# Patient Record
Sex: MALE | Race: OTHER | HISPANIC OR LATINO
[De-identification: names, ages, dates, MRNs, and addresses within clinical notes are randomized per-mention and may not be internally consistent; named-entity substitution may affect disease eponyms.]

---

## 2017-02-23 PROBLEM — Z00.00 ENCOUNTER FOR PREVENTIVE HEALTH EXAMINATION: Status: ACTIVE | Noted: 2017-02-23

## 2017-03-06 ENCOUNTER — APPOINTMENT (OUTPATIENT)
Dept: HUMAN REPRODUCTION | Facility: CLINIC | Age: 32
End: 2017-03-06

## 2018-03-19 ENCOUNTER — APPOINTMENT (OUTPATIENT)
Dept: SURGERY | Facility: CLINIC | Age: 33
End: 2018-03-19
Payer: COMMERCIAL

## 2018-03-19 ENCOUNTER — LABORATORY RESULT (OUTPATIENT)
Age: 33
End: 2018-03-19

## 2018-03-19 VITALS
HEIGHT: 72 IN | HEART RATE: 69 BPM | WEIGHT: 176 LBS | SYSTOLIC BLOOD PRESSURE: 106 MMHG | TEMPERATURE: 98.2 F | DIASTOLIC BLOOD PRESSURE: 71 MMHG | BODY MASS INDEX: 23.84 KG/M2

## 2018-03-19 DIAGNOSIS — R22.2 LOCALIZED SWELLING, MASS AND LUMP, TRUNK: ICD-10-CM

## 2018-03-19 DIAGNOSIS — Z72.3 LACK OF PHYSICAL EXERCISE: ICD-10-CM

## 2018-03-19 PROCEDURE — 99202 OFFICE O/P NEW SF 15 MIN: CPT | Mod: 25

## 2018-03-19 PROCEDURE — 21555 EXC NECK LES SC < 3 CM: CPT

## 2018-03-29 ENCOUNTER — APPOINTMENT (OUTPATIENT)
Dept: SURGERY | Facility: CLINIC | Age: 33
End: 2018-03-29

## 2020-04-09 PROBLEM — R22.2 MASS OF CHEST WALL: Status: ACTIVE | Noted: 2018-03-19

## 2021-09-27 ENCOUNTER — APPOINTMENT (OUTPATIENT)
Dept: UROLOGY | Facility: CLINIC | Age: 36
End: 2021-09-27
Payer: COMMERCIAL

## 2021-09-27 VITALS
BODY MASS INDEX: 23.7 KG/M2 | HEART RATE: 73 BPM | RESPIRATION RATE: 16 BRPM | HEIGHT: 72 IN | OXYGEN SATURATION: 95 % | WEIGHT: 175 LBS | TEMPERATURE: 98.3 F | DIASTOLIC BLOOD PRESSURE: 72 MMHG | SYSTOLIC BLOOD PRESSURE: 113 MMHG

## 2021-09-27 PROCEDURE — 99204 OFFICE O/P NEW MOD 45 MIN: CPT

## 2021-09-27 RX ORDER — CLOTRIMAZOLE AND BETAMETHASONE DIPROPIONATE 10; .5 MG/G; MG/G
1-0.05 CREAM TOPICAL TWICE DAILY
Qty: 1 | Refills: 1 | Status: ACTIVE | COMMUNITY
Start: 2021-09-27 | End: 1900-01-01

## 2021-09-27 NOTE — HISTORY OF PRESENT ILLNESS
[FreeTextEntry1] : Patient is a 35 yo M who presents for phimosis and balanitis.\par \par He reports past 2 months he has noticed tightening of foreskin and redness of foreskin/glans.  He has pain when his penis is erect, but also lubricating well he can engage in sexual activity without difficulty or pain.  At rest, to retract his foreskin there is some difficulty and mild discomfort.\par \par He also notes redness on head of penis.  No itch.  No ulcerations or sores.\par \par Denies DM.  No fever/chills or difficulty voiding.

## 2021-09-27 NOTE — PHYSICAL EXAM
[General Appearance - Well Developed] : well developed [General Appearance - Well Nourished] : well nourished [Normal Appearance] : normal appearance [Well Groomed] : well groomed [General Appearance - In No Acute Distress] : no acute distress [Edema] : no peripheral edema [Respiration, Rhythm And Depth] : normal respiratory rhythm and effort [Exaggerated Use Of Accessory Muscles For Inspiration] : no accessory muscle use [Abdomen Soft] : soft [Abdomen Tenderness] : non-tender [Abdomen Hernia] : no hernia was discovered [Urethral Meatus] : meatus normal [Urinary Bladder Findings] : the bladder was normal on palpation [Scrotum] : the scrotum was normal [Epididymis] : the epididymides were normal [Testes Tenderness] : no tenderness of the testes [Testes Mass (___cm)] : there were no testicular masses [FreeTextEntry1] : uncirc phallus with phimotic mild band - able to retract foreskin.  There is mild redness/erythema of glans c/w balanitis [Normal Station and Gait] : the gait and station were normal for the patient's age [] : no rash [No Focal Deficits] : no focal deficits [Oriented To Time, Place, And Person] : oriented to person, place, and time [Affect] : the affect was normal [Mood] : the mood was normal [Not Anxious] : not anxious

## 2021-09-27 NOTE — ASSESSMENT
[FreeTextEntry1] : Patient is a 37 yo M who presents with phimosis and balanitis.\par \par -discussed with pt hygiene practices\par -topical antifungal/steroid cream rx provided, instructed on proper use\par -discussed with pt that he should see his PCP for evaluation of potential underlying contributors such as DM\par -D/w pt that if topical cream does not resolve his condition, he will likely require circumcision\par -f/u 2 mos

## 2021-11-08 ENCOUNTER — APPOINTMENT (OUTPATIENT)
Dept: UROLOGY | Facility: CLINIC | Age: 36
End: 2021-11-08
Payer: COMMERCIAL

## 2021-11-08 VITALS
SYSTOLIC BLOOD PRESSURE: 105 MMHG | RESPIRATION RATE: 16 BRPM | HEART RATE: 69 BPM | TEMPERATURE: 98 F | BODY MASS INDEX: 24.14 KG/M2 | OXYGEN SATURATION: 95 % | DIASTOLIC BLOOD PRESSURE: 70 MMHG | WEIGHT: 178 LBS

## 2021-11-08 PROCEDURE — 99214 OFFICE O/P EST MOD 30 MIN: CPT

## 2021-11-08 NOTE — PHYSICAL EXAM
[General Appearance - Well Developed] : well developed [General Appearance - Well Nourished] : well nourished [Normal Appearance] : normal appearance [Well Groomed] : well groomed [General Appearance - In No Acute Distress] : no acute distress [Urethral Meatus] : meatus normal [Urinary Bladder Findings] : the bladder was normal on palpation [Scrotum] : the scrotum was normal [Epididymis] : the epididymides were normal [Testes Tenderness] : no tenderness of the testes [Testes Mass (___cm)] : there were no testicular masses [FreeTextEntry1] : uncirc phallus with phimotic mild band - able to retract foreskin.  Minimal redness - significantly improved

## 2021-11-08 NOTE — ASSESSMENT
[FreeTextEntry1] : Patient is a 37 yo M who presents for phimosis.\par \par I discussed with pt the circumcision procedure and its risks/benefits/alternatives.  I d/w pt that there will be a scar circumferentially around his shaft after foreskin removed, and there are cosmetic considerations to the scar and differences in shaft and foreskin color.  I also d/w pt that he may experience sexual changes/numbness after circumcision.  Also d/w pt typical postop recovery and need for abstinence after circumcision.

## 2021-11-08 NOTE — HISTORY OF PRESENT ILLNESS
[FreeTextEntry1] : Patient is a 37 yo M who presents for phimosis and balanitis.\par \par HPI/PRIOR HX:\par He reports past 2 months he has noticed tightening of foreskin and redness of foreskin/glans.  He has pain when his penis is erect, but also lubricating well he can engage in sexual activity without difficulty or pain.  At rest, to retract his foreskin there is some difficulty and mild discomfort.\par He also notes redness on head of penis.  No itch.  No ulcerations or sores.\par Denies DM.  No fever/chills or difficulty voiding.\par \par INTERVAL hx:\par Applied topical cream - rash has resolved.\par The tight band has not resolved - he still has tightness and pain with erections.\par He desires circumcision. Price (Do Not Change): 0.00 Instructions: This plan will send the code FBSD to the PM system.  DO NOT or CHANGE the price. no Detail Level: Simple

## 2022-02-25 ENCOUNTER — OUTPATIENT (OUTPATIENT)
Dept: OUTPATIENT SERVICES | Facility: HOSPITAL | Age: 37
LOS: 1 days | End: 2022-02-25
Payer: COMMERCIAL

## 2022-02-25 VITALS
OXYGEN SATURATION: 98 % | DIASTOLIC BLOOD PRESSURE: 75 MMHG | TEMPERATURE: 99 F | HEART RATE: 72 BPM | HEIGHT: 71 IN | RESPIRATION RATE: 16 BRPM | WEIGHT: 169.09 LBS | SYSTOLIC BLOOD PRESSURE: 107 MMHG

## 2022-02-25 DIAGNOSIS — N47.1 PHIMOSIS: ICD-10-CM

## 2022-02-25 DIAGNOSIS — Z78.9 OTHER SPECIFIED HEALTH STATUS: ICD-10-CM

## 2022-02-25 DIAGNOSIS — Z98.890 OTHER SPECIFIED POSTPROCEDURAL STATES: Chronic | ICD-10-CM

## 2022-02-25 DIAGNOSIS — Z01.818 ENCOUNTER FOR OTHER PREPROCEDURAL EXAMINATION: ICD-10-CM

## 2022-02-25 LAB
ANION GAP SERPL CALC-SCNC: 14 MMOL/L — SIGNIFICANT CHANGE UP (ref 5–17)
BUN SERPL-MCNC: 19 MG/DL — SIGNIFICANT CHANGE UP (ref 7–23)
CALCIUM SERPL-MCNC: 9.3 MG/DL — SIGNIFICANT CHANGE UP (ref 8.4–10.5)
CHLORIDE SERPL-SCNC: 105 MMOL/L — SIGNIFICANT CHANGE UP (ref 96–108)
CO2 SERPL-SCNC: 22 MMOL/L — SIGNIFICANT CHANGE UP (ref 22–31)
CREAT SERPL-MCNC: 0.86 MG/DL — SIGNIFICANT CHANGE UP (ref 0.5–1.3)
GLUCOSE SERPL-MCNC: 94 MG/DL — SIGNIFICANT CHANGE UP (ref 70–99)
HCT VFR BLD CALC: 45 % — SIGNIFICANT CHANGE UP (ref 39–50)
HGB BLD-MCNC: 15.2 G/DL — SIGNIFICANT CHANGE UP (ref 13–17)
MCHC RBC-ENTMCNC: 29.8 PG — SIGNIFICANT CHANGE UP (ref 27–34)
MCHC RBC-ENTMCNC: 33.8 GM/DL — SIGNIFICANT CHANGE UP (ref 32–36)
MCV RBC AUTO: 88.2 FL — SIGNIFICANT CHANGE UP (ref 80–100)
NRBC # BLD: 0 /100 WBCS — SIGNIFICANT CHANGE UP (ref 0–0)
PLATELET # BLD AUTO: 296 K/UL — SIGNIFICANT CHANGE UP (ref 150–400)
POTASSIUM SERPL-MCNC: 4.1 MMOL/L — SIGNIFICANT CHANGE UP (ref 3.5–5.3)
POTASSIUM SERPL-SCNC: 4.1 MMOL/L — SIGNIFICANT CHANGE UP (ref 3.5–5.3)
RBC # BLD: 5.1 M/UL — SIGNIFICANT CHANGE UP (ref 4.2–5.8)
RBC # FLD: 12.1 % — SIGNIFICANT CHANGE UP (ref 10.3–14.5)
SODIUM SERPL-SCNC: 141 MMOL/L — SIGNIFICANT CHANGE UP (ref 135–145)
WBC # BLD: 7.69 K/UL — SIGNIFICANT CHANGE UP (ref 3.8–10.5)
WBC # FLD AUTO: 7.69 K/UL — SIGNIFICANT CHANGE UP (ref 3.8–10.5)

## 2022-02-25 PROCEDURE — 85027 COMPLETE CBC AUTOMATED: CPT

## 2022-02-25 PROCEDURE — 80048 BASIC METABOLIC PNL TOTAL CA: CPT

## 2022-02-25 PROCEDURE — G0463: CPT

## 2022-02-25 RX ORDER — LIDOCAINE HCL 20 MG/ML
0.2 VIAL (ML) INJECTION ONCE
Refills: 0 | Status: DISCONTINUED | OUTPATIENT
Start: 2022-03-02 | End: 2022-03-16

## 2022-02-25 RX ORDER — SODIUM CHLORIDE 9 MG/ML
3 INJECTION INTRAMUSCULAR; INTRAVENOUS; SUBCUTANEOUS EVERY 8 HOURS
Refills: 0 | Status: DISCONTINUED | OUTPATIENT
Start: 2022-03-02 | End: 2022-03-16

## 2022-02-25 RX ORDER — SODIUM CHLORIDE 9 MG/ML
1000 INJECTION, SOLUTION INTRAVENOUS
Refills: 0 | Status: DISCONTINUED | OUTPATIENT
Start: 2022-03-02 | End: 2022-03-16

## 2022-02-25 NOTE — H&P PST ADULT - HISTORY OF PRESENT ILLNESS
36 year old male with no significant PMH reports c/o tightening of foreskin around the penis with redness x 2 months. He denies any itchiness, ulcerations, sores or difficulty voiding. He has been using a topical cream which helped resolved the rash, however the tightness and discomfort is still present. Pt now presents to [facility] for scheduled circumcision with Dr. Shaikh on 3/2/22.    Covid PCR test scheduled for 2/27/22 at [facility]  Covid vaccine 2nd dose received  Denies Recent travel, Exposure or Covid symptoms  No recent hospitalizations over the past 3 months 36 year old male with PMH Lumbar Surgery (3 years ago) reports c/o tightening of foreskin around the penis with redness x 2 months. He denies any itchiness, ulcerations, sores or difficulty voiding. He has been using a topical cream which helped resolved the rash, however the tightness and discomfort is still present. Pt now presents to [facility] for scheduled circumcision with Dr. Shaikh on 3/2/22.    Covid vaccine Pfizer 2nd dose received 8/14/21; Pending Booster  Denies Recent travel, Exposure or Covid symptoms  No recent hospitalizations over the past 3 months  Covid + 1/11/22 (in HIE with [facility]) Symptoms of cough and loss of smell x few days; No SOB or Hospitalizations - Managed conservatively at home 36 year old male with PMH Religious and Lumbar Surgery (3 years ago) reports c/o tightening of foreskin around the penis with redness x 2 months. He denies any itchiness, ulcerations, sores or difficulty voiding. He has been using a topical cream which helped resolved the rash, however the tightness and discomfort is still present. Pt now presents to [facility] for scheduled circumcision with Dr. Shaikh on 3/2/22.    Covid vaccine Pfizer 2nd dose received 8/14/21; Pending Booster  Denies Recent travel, Exposure or Covid symptoms  No recent hospitalizations over the past 3 months  Covid + 1/11/22 (in HIE with [facility]) Symptoms of cough and loss of smell x few days; No SOB or Hospitalizations - Managed conservatively at home  No Pre-Op Covid Test Required as per [facility] Protocol (Covid + within 90 days)

## 2022-02-25 NOTE — H&P PST ADULT - PROBLEM SELECTOR PLAN 1
Pt is scheduled to have a circumc[facility] Dr[facility]  Covid PCR test scheduled for 2/27/22 at [facility]  CBC, BMP ordered and obtained at [facility] Pt is scheduled to have a circumcision with Dr. Shaikh on 3/2/[facility]  CBC, BMP ordered and obtained at [facility]  No Covid PCR Pre-Op Test Required as per [facility] Protocol (Covid + within 90 days)  Blood Refusal Consent form filled out by patient and in chart Pt is scheduled to have a circumcision with Dr. Shaikh on 3/2/[facility]  CBC, BMP ordered and obtained at [facility]  No Covid PCR Pre-Op Test Required as per [facility] Protocol (Covid + within 90 days)

## 2022-02-25 NOTE — H&P PST ADULT - FALL HARM RISK - UNIVERSAL INTERVENTIONS
Bed in lowest position, wheels locked, appropriate side rails in place/Call bell, personal items and telephone in reach/Instruct patient to call for assistance before getting out of bed or chair/Non-slip footwear when patient is out of bed/[place] to call system/Physically safe environment - no spills, clutter or unnecessary equipment/Purposeful Proactive Rounding/Room/bathroom lighting operational, light cord in reach

## 2022-02-25 NOTE — H&P PST ADULT - NSICDXPASTMEDICALHX_GEN_ALL_CORE_FT
PAST MEDICAL HISTORY:  Phimosis of penis      PAST MEDICAL HISTORY:  2019 novel coronavirus disease (COVID-19) Covid + 1/11/22 (in HIE with [facility]) Symptoms of cough and loss of smell x few days; No SOB or Hospitalizations - Managed conservatively at home    Phimosis of penis      PAST MEDICAL HISTORY:  2019 novel coronavirus disease (COVID-19) Covid + 1/11/22 (in HIE with [facility]) Symptoms of cough and loss of smell x few days; No SOB or Hospitalizations - Managed conservatively at home    Patient is Tenriism     Phimosis of penis

## 2022-02-25 NOTE — H&P PST ADULT - PROBLEM SELECTOR PLAN 2
Surgeon notified  HCP copy in chart  Pt provided a blood refusal consent form that he will be bringing in the DOS completed

## 2022-02-25 NOTE — H&P PST ADULT - ALCOHOL USE HISTORY SINGLE SELECT
Prophylactic measure Prophylactic measure Prophylactic measure Prophylactic measure Prophylactic measure Prophylactic measure Prophylactic measure Prophylactic measure Prophylactic measure Prophylactic measure Prophylactic measure never

## 2022-03-01 ENCOUNTER — TRANSCRIPTION ENCOUNTER (OUTPATIENT)
Age: 37
End: 2022-03-01

## 2022-03-02 ENCOUNTER — RESULT REVIEW (OUTPATIENT)
Age: 37
End: 2022-03-02

## 2022-03-02 ENCOUNTER — OUTPATIENT (OUTPATIENT)
Dept: OUTPATIENT SERVICES | Facility: HOSPITAL | Age: 37
LOS: 1 days | End: 2022-03-02
Payer: COMMERCIAL

## 2022-03-02 ENCOUNTER — APPOINTMENT (OUTPATIENT)
Dept: UROLOGY | Facility: HOSPITAL | Age: 37
End: 2022-03-02

## 2022-03-02 VITALS
DIASTOLIC BLOOD PRESSURE: 69 MMHG | SYSTOLIC BLOOD PRESSURE: 115 MMHG | HEART RATE: 84 BPM | OXYGEN SATURATION: 100 % | RESPIRATION RATE: 16 BRPM

## 2022-03-02 VITALS
SYSTOLIC BLOOD PRESSURE: 114 MMHG | OXYGEN SATURATION: 99 % | HEART RATE: 75 BPM | DIASTOLIC BLOOD PRESSURE: 75 MMHG | WEIGHT: 169.09 LBS | TEMPERATURE: 98 F | RESPIRATION RATE: 15 BRPM | HEIGHT: 71 IN

## 2022-03-02 DIAGNOSIS — Z98.890 OTHER SPECIFIED POSTPROCEDURAL STATES: Chronic | ICD-10-CM

## 2022-03-02 DIAGNOSIS — N47.1 PHIMOSIS: ICD-10-CM

## 2022-03-02 PROCEDURE — 88304 TISSUE EXAM BY PATHOLOGIST: CPT | Mod: 26

## 2022-03-02 PROCEDURE — 88304 TISSUE EXAM BY PATHOLOGIST: CPT

## 2022-03-02 PROCEDURE — 54161 CIRCUM 28 DAYS OR OLDER: CPT

## 2022-03-02 RX ORDER — ONDANSETRON 8 MG/1
4 TABLET, FILM COATED ORAL ONCE
Refills: 0 | Status: DISCONTINUED | OUTPATIENT
Start: 2022-03-02 | End: 2022-03-16

## 2022-03-02 RX ORDER — OXYCODONE HYDROCHLORIDE 5 MG/1
5 TABLET ORAL ONCE
Refills: 0 | Status: DISCONTINUED | OUTPATIENT
Start: 2022-03-02 | End: 2022-03-02

## 2022-03-02 RX ADMIN — SODIUM CHLORIDE 100 MILLILITER(S): 9 INJECTION, SOLUTION INTRAVENOUS at 05:52

## 2022-03-02 NOTE — ASU DISCHARGE PLAN (ADULT/PEDIATRIC) - NURSING INSTRUCTIONS
You may take tylenol and ibuprophen for pain as needed. The next time you can take tylenol or ibuprophen would be at 120pm

## 2022-03-02 NOTE — ASU PATIENT PROFILE, ADULT - FALL HARM RISK - UNIVERSAL INTERVENTIONS
Bed in lowest position, wheels locked, appropriate side rails in place/Call bell, personal items and telephone in reach/Instruct patient to call for assistance before getting out of bed or chair/Non-slip footwear when patient is out of bed/[place] to call system/Physically safe environment - no spills, clutter or unnecessary equipment/Purposeful Proactive Rounding/Room/bathroom lighting operational, light cord in reach

## 2022-03-02 NOTE — ASU DISCHARGE PLAN (ADULT/PEDIATRIC) - CALL YOUR DOCTOR IF YOU HAVE ANY OF THE FOLLOWING:
Fever greater than (need to indicate Fahrenheit or Celsius)/Numbness, tingling, color or temperature change to extremity/Nausea and vomiting that does not stop/Unable to urinate

## 2022-03-02 NOTE — ASU PATIENT PROFILE, ADULT - NSICDXPASTMEDICALHX_GEN_ALL_CORE_FT
PAST MEDICAL HISTORY:  2019 novel coronavirus disease (COVID-19) Covid + 1/11/22 (in HIE with [facility]) Symptoms of cough and loss of smell x few days; No SOB or Hospitalizations - Managed conservatively at home    Patient is [employer]ian[employer]     Phimosis of penis

## 2022-03-02 NOTE — ASU DISCHARGE PLAN (ADULT/PEDIATRIC) - CARE PROVIDER_API CALL
Pk Shaikh)  Urology  [address] [place] [address], [place] [address]  Phone: [phone]  Fax: [phone]  Follow Up Time:

## 2022-03-02 NOTE — ASU PATIENT PROFILE, ADULT - NSICDXPASTSURGICALHX_GEN_ALL_CORE_FT
PAST SURGICAL HISTORY:  History of endoscopy "years ago" for acid reflux - not on any medications currently    History of lumbar surgery possibly 3 years ago - unsure of type of lumbar surgery    S/P LASIK surgery of both eyes 9 years ago

## 2022-03-02 NOTE — ASU DISCHARGE PLAN (ADULT/PEDIATRIC) - NS MD DC FALL RISK RISK
For information on Fall & Injury Prevention, visit: [email or web address] OR  [email or web address] OR  https://www.cdc.gov/steadi/patient.html

## 2022-03-02 NOTE — ASU DISCHARGE PLAN (ADULT/PEDIATRIC) - ASU DC SPECIAL INSTRUCTIONSFT
BATHING: Please do not submerge wound underwater. Remove dressing in 48 hours. You may shower in 48 hours. Keep incisions clean, you may apply bacitracin to the incision after daily after dressing removal  ACTIVITY: No straddle activities such as bicycle or sexual intercourse until you are evaluated at your post-operative appointment. Otherwise, you may return to your usual level of physical activity.  DIET: Return to your usual diet, begin with liquid foods and progress slowly.  NOTIFY YOUR SURGEON IF: You have any bleeding that does not stop, any pus draining from your wound, any fever (over 100.4 F) or chills, persistent nausea/vomiting, persistent diarrhea, or if your pain is not controlled on your discharge pain medications.  PAIN: You may take Tylenol and Ibuprofen, available over the counter, for pain. You may take either one every 6 hours, you may alternate these medications so that you take one every 3 hours (e.g., Tylenol at 12pm, Ibuprofen at 3pm, Tylenol at 6pm, Ibuprofen at 9pm, etc...). Follow the maximum doses and administration instructions on the bottles. Do not exceed 4 grams of Tylenol daily. If your pain is not controlled with over the counter pain medications, please call your urologist.  FOLLOW-UP: When you arrive home or the following day, please call to arrange a follow-up appointment with Dr. Shaikh in 2 weeks.

## 2022-03-03 PROBLEM — N47.1 PHIMOSIS: Chronic | Status: ACTIVE | Noted: 2022-02-25

## 2022-03-03 PROBLEM — U07.1 COVID-19: Chronic | Status: ACTIVE | Noted: 2022-02-25

## 2022-03-03 PROBLEM — Z78.9 OTHER SPECIFIED HEALTH STATUS: Chronic | Status: ACTIVE | Noted: 2022-02-25

## 2022-03-08 ENCOUNTER — APPOINTMENT (OUTPATIENT)
Age: 37
End: 2022-03-08

## 2022-03-08 LAB — SURGICAL PATHOLOGY STUDY: SIGNIFICANT CHANGE UP

## 2022-03-14 ENCOUNTER — APPOINTMENT (OUTPATIENT)
Dept: UROLOGY | Facility: CLINIC | Age: 37
End: 2022-03-14
Payer: COMMERCIAL

## 2022-03-14 VITALS
DIASTOLIC BLOOD PRESSURE: 66 MMHG | WEIGHT: 175 LBS | HEART RATE: 78 BPM | OXYGEN SATURATION: 96 % | SYSTOLIC BLOOD PRESSURE: 105 MMHG | TEMPERATURE: 98 F | RESPIRATION RATE: 16 BRPM | BODY MASS INDEX: 23.73 KG/M2

## 2022-03-14 PROCEDURE — 99213 OFFICE O/P EST LOW 20 MIN: CPT

## 2022-03-14 NOTE — ASSESSMENT
[FreeTextEntry1] : Patient is a 37 yo M who presents for phimosis and balanitis.\par \par \par S/p circumcision\par Doing well\par F/u 1 mo

## 2022-03-14 NOTE — HISTORY OF PRESENT ILLNESS
[FreeTextEntry1] : Patient is a 35 yo M who presents for phimosis and balanitis.\par \par HPI/PRIOR HX:\par He reports past 2 months he has noticed tightening of foreskin and redness of foreskin/glans.  He has pain when his penis is erect, but also lubricating well he can engage in sexual activity without difficulty or pain.  At rest, to retract his foreskin there is some difficulty and mild discomfort.\par He also notes redness on head of penis.  No itch.  No ulcerations or sores.\par Denies DM.  No fever/chills or difficulty voiding.\par \par INTERVAL hx:\par S/p circumcision.\par Here for f/u.  Doing well.  Minimal pain, has returned to work.

## 2022-04-18 ENCOUNTER — APPOINTMENT (OUTPATIENT)
Dept: UROLOGY | Facility: CLINIC | Age: 37
End: 2022-04-18
Payer: COMMERCIAL

## 2022-04-18 VITALS
TEMPERATURE: 97.9 F | RESPIRATION RATE: 16 BRPM | SYSTOLIC BLOOD PRESSURE: 110 MMHG | HEART RATE: 60 BPM | WEIGHT: 173 LBS | OXYGEN SATURATION: 98 % | BODY MASS INDEX: 23.46 KG/M2 | DIASTOLIC BLOOD PRESSURE: 74 MMHG

## 2022-04-18 DIAGNOSIS — N48.1 BALANITIS: ICD-10-CM

## 2022-04-18 DIAGNOSIS — N47.1 PHIMOSIS: ICD-10-CM

## 2022-04-18 PROCEDURE — 99213 OFFICE O/P EST LOW 20 MIN: CPT

## 2022-04-18 NOTE — HISTORY OF PRESENT ILLNESS
[FreeTextEntry1] : Patient is a 35 yo M who presents for phimosis and balanitis.\par \par HPI/PRIOR HX:\par He reports past 2 months he has noticed tightening of foreskin and redness of foreskin/glans.  He has pain when his penis is erect, but also lubricating well he can engage in sexual activity without difficulty or pain.  At rest, to retract his foreskin there is some difficulty and mild discomfort.\par He also notes redness on head of penis.  No itch.  No ulcerations or sores.\par Denies DM.  No fever/chills or difficulty voiding.\par \par INTERVAL hx:\par S/p circumcision.\par Here for f/u.  Doing well.  No pain.\par 2 stitches still in place

## 2022-04-18 NOTE — ASSESSMENT
[FreeTextEntry1] : Patient is a 35 yo M who presents for phimosis and balanitis.\par S/p circ\par Healing well\par Ok to resume all normal activities/sex in 1 wk\par F/u prn

## 2022-04-18 NOTE — PHYSICAL EXAM
[General Appearance - Well Developed] : well developed [General Appearance - Well Nourished] : well nourished [Normal Appearance] : normal appearance [Well Groomed] : well groomed [General Appearance - In No Acute Distress] : no acute distress [Urethral Meatus] : meatus normal [Urinary Bladder Findings] : the bladder was normal on palpation [Scrotum] : the scrotum was normal [Epididymis] : the epididymides were normal [Testes Tenderness] : no tenderness of the testes [Testes Mass (___cm)] : there were no testicular masses [FreeTextEntry1] : well healing circ suture line - two sutures removed/cut

## (undated) DEVICE — SOL IRR POUR NS 0.9% 500ML

## (undated) DEVICE — MARKING PEN W RULER

## (undated) DEVICE — ELCTR BOVIE TIP NEEDLE 2.84"

## (undated) DEVICE — GLV 7.5 PROTEXIS (CREAM) MICRO

## (undated) DEVICE — WARMING BLANKET UPPER ADULT

## (undated) DEVICE — DRAPE INSTRUMENT POUCH 6.75" X 11"

## (undated) DEVICE — DRAPE LAPAROTOMY W VELCRO CORD TABS

## (undated) DEVICE — DRSG GAUZE VASELINE PETROLEUM 1 X 8" CISION

## (undated) DEVICE — SUT CHROMIC 3-0 27" SH

## (undated) DEVICE — DRSG GAUZE VASELINE PETROLEUM 3 X 9"

## (undated) DEVICE — SOL IRR POUR H2O 250ML

## (undated) DEVICE — SPECIMEN CONTAINER 100ML

## (undated) DEVICE — BLADE SCALPEL SAFETYLOCK #15

## (undated) DEVICE — SUT CHROMIC 4-0 18" P-12

## (undated) DEVICE — DRSG COBAN 4"

## (undated) DEVICE — BLADE SCALPEL SAFETYLOCK #10

## (undated) DEVICE — VENODYNE/SCD SLEEVE CALF MEDIUM

## (undated) DEVICE — PACK MINOR

## (undated) DEVICE — SUT CHROMIC 4-0 30" CV-23

## (undated) DEVICE — PREP BETADINE SPONGE STICKS

## (undated) DEVICE — POSITIONER FOAM EGG CRATE ULNAR 2PCS (PINK)